# Patient Record
Sex: MALE | Race: WHITE | URBAN - METROPOLITAN AREA
[De-identification: names, ages, dates, MRNs, and addresses within clinical notes are randomized per-mention and may not be internally consistent; named-entity substitution may affect disease eponyms.]

---

## 2021-10-16 ENCOUNTER — IMMUNIZATION (OUTPATIENT)
Dept: FAMILY MEDICINE CLINIC | Facility: CLINIC | Age: 46
End: 2021-10-16
Payer: COMMERCIAL

## 2021-10-16 PROCEDURE — 90686 IIV4 VACC NO PRSV 0.5 ML IM: CPT | Performed by: NURSE PRACTITIONER

## 2021-10-16 PROCEDURE — 90471 IMMUNIZATION ADMIN: CPT | Performed by: NURSE PRACTITIONER

## 2022-11-06 ENCOUNTER — IMMUNIZATION (OUTPATIENT)
Dept: FAMILY MEDICINE CLINIC | Facility: CLINIC | Age: 47
End: 2022-11-06
Payer: COMMERCIAL

## 2022-11-06 PROCEDURE — 90686 IIV4 VACC NO PRSV 0.5 ML IM: CPT | Performed by: FAMILY MEDICINE

## 2022-11-06 PROCEDURE — 90471 IMMUNIZATION ADMIN: CPT | Performed by: FAMILY MEDICINE

## 2023-10-25 ENCOUNTER — OFFICE VISIT (OUTPATIENT)
Facility: LOCATION | Age: 48
End: 2023-10-25

## 2023-10-25 DIAGNOSIS — K21.9 LARYNGOPHARYNGEAL REFLUX: Primary | ICD-10-CM

## 2023-10-25 DIAGNOSIS — R13.10 DYSPHAGIA, UNSPECIFIED TYPE: ICD-10-CM

## 2023-10-25 PROCEDURE — 99204 OFFICE O/P NEW MOD 45 MIN: CPT | Performed by: OTOLARYNGOLOGY

## 2023-10-25 PROCEDURE — 31575 DIAGNOSTIC LARYNGOSCOPY: CPT | Performed by: OTOLARYNGOLOGY

## 2023-10-25 RX ORDER — OMEPRAZOLE 40 MG/1
40 CAPSULE, DELAYED RELEASE ORAL NIGHTLY
Qty: 30 CAPSULE | Refills: 2 | Status: SHIPPED | OUTPATIENT
Start: 2023-10-25

## 2023-10-25 NOTE — PROGRESS NOTES
[facility], [facility] Carly Ojeda    Report of Consultation    Date of Consult: 10/25/2023     Reason for Consultation:   Left-sided throat discomfort. History of Present Illness:   Patient is a 51year old male who is being seen for fullness within the region of his left neck. He had a ultrasound approximately 1 year ago at that time showed only 1 cm lymph node which appeared inflammatory. There is family history of thyroiditis. Currently feels a foreign body occasionally when he swallows without change in voice. Denies nausea or vomiting. Past Medical History  History reviewed. No pertinent past medical history. Past Surgical History  History reviewed. No pertinent surgical history. Family History  History reviewed. No pertinent family history. Social History  Patient Guardians:  Not on file    Other Topics            Concern    None on file    Social History Narrative    None on file            Current Medications:  Current Outpatient Medications   Medication Sig Dispense Refill    Omeprazole 40 MG Oral Capsule Delayed Release Take 1 capsule (40 mg total) by mouth at bedtime. 30 capsule 2       Allergies  Not on File    Review of Systems:   A comprehensive review of systems was negative. Physical Exam:   There were no vitals taken for this visit. Constitutional Normal Overall appearance - Normal.   Psychiatric Normal Orientation - Oriented to time, place, person & situation. Appropriate mood and affect.    Head/Face Normal Facial features -- Normal. Skull - Normal.   Eyes Normal Pupils equal ,round ,react to light and accomidate   Ears Normal External Ear Right: Normal, Left: Normal. Canal - Right: Normal, Left: Normal. TM - Right: Normal, Left: Normal.   Nose Normal External Nose, Normal, Septum -Midline, Right, Left Turbinates - Right: Normal, Hypertrophic Left: Normal, Hypertrophic   Mouth/Throat Normal Lips/teeth/gums - Normal. Tonsils - Normal. Oropharynx - Normal.   Neck Exam Normal Inspection - Normal. Palpation - Normal. Parotid gland - Normal. Thyroid gland - Normal.  There is no tenderness over the thyroid or erythema. Neurological Normal Memory - Normal. Cranial nerves - Cranial nerves II through XII grossly intact. Nasopharynx Normal  Normal        Skin Normal Inspection - Normal.        Lymph Detail Normal Submental. Submandibular. Anterior cervical. Posterior cervical. Supraclavicular. Flexible Laryngoscopy Procedure Note (26773)    Due to inability for adequate examination of the larynx and need for magnification to perform the examination, endoscopy was performed. Risks and benefits were discussed with patient/family and they have given verbal consent to proceed. Pre-operative Diagnosis: Laryngopharyngeal reflux  (primary encounter diagnosis)  Dysphagia, unspecified type    Post-operative Diagnosis: Same    Procedure: Diagnostic flexible fiberoptic laryngoscopy    Anesthesia: Topical 2% lidocaine with oxymetazoline 0.025%    Surgeon: Ramesh Ocasio MD    EBL: 0cc    Procedure Detail & Findings: Vocal cords appear mobile no polyps nodules. Slight swelling of the arytenoid cartilage. Condition: Stable    Complications: Patient tolerated the procedure well with no immediate complications. Results:     Laboratory Data:  No results found for: \"WBC\", \"HGB\", \"HCT\", \"PLT\", \"CREATSERUM\", \"BUN\", \"NA\", \"K\", \"CL\", \"CO2\", \"GLU\", \"CA\", \"ALB\", \"ALKPHO\", \"TP\", \"AST\", \"ALT\", \"PTT\", \"INR\", \"PTP\", \"T4F\", \"TSH\", \"TSHREFLEX\", \"POP\", \"LIP\", \"GGT\", \"PSA\", \"DDIMER\", \"ESRML\", \"ESRPF\", \"CRP\", \"BNP\", \"MG\", \"PHOS\", \"TROP\", \"CK\", \"CKMB\", \"JAMIE\", \"RPR\", \"B12\", \"ETOH\", \"POCGLU\"      Imaging:  No results found. Impression:   The discomfort may possibly related to reflux. Recommendations:  He was started on omeprazole at bedtime to see if that would lessen the symptoms. If symptoms would persist he should let us know in approximately 2 to 3 weeks.   If there is any further swelling in the neck recommend CT scan of neck with contrast.    Patient understands agrees with her treatment plan. Thank you for allowing me to participate in the care of your patient.       Scott Kenney MD  10/25/2023

## 2024-06-27 ENCOUNTER — HOSPITAL ENCOUNTER (OUTPATIENT)
Dept: CV DIAGNOSTICS | Facility: HOSPITAL | Age: 49
Discharge: HOME | End: 2024-06-27
Attending: FAMILY MEDICINE
Payer: COMMERCIAL

## 2024-06-27 DIAGNOSIS — I48.0 PAROXYSMAL ATRIAL FIBRILLATION (HCC): ICD-10-CM

## 2024-06-27 PROCEDURE — 93242 EXT ECG>48HR<7D RECORDING: CPT | Performed by: FAMILY MEDICINE

## 2024-06-27 PROCEDURE — 93243 EXT ECG>48HR<7D SCAN A/R: CPT | Performed by: FAMILY MEDICINE

## 2024-09-11 ENCOUNTER — HOSPITAL ENCOUNTER (OUTPATIENT)
Dept: LAB | Facility: HOSPITAL | Age: 49
Discharge: HOME | End: 2024-09-11
Attending: INTERNAL MEDICINE
Payer: COMMERCIAL

## 2024-09-11 LAB
T4 FREE SERPL-MCNC: 1.1 NG/DL (ref 0.8–1.7)
TSI SER-ACNC: 1.86 MIU/ML (ref 0.55–4.78)

## 2024-09-11 PROCEDURE — 84443 ASSAY THYROID STIM HORMONE: CPT | Performed by: INTERNAL MEDICINE

## 2024-09-11 PROCEDURE — 84439 ASSAY OF FREE THYROXINE: CPT | Performed by: INTERNAL MEDICINE

## 2024-09-11 PROCEDURE — 36415 COLL VENOUS BLD VENIPUNCTURE: CPT | Performed by: INTERNAL MEDICINE

## 2024-10-23 ENCOUNTER — APPOINTMENT (OUTPATIENT)
Dept: URBAN - METROPOLITAN AREA CLINIC 242 | Age: 49
Setting detail: DERMATOLOGY
End: 2024-10-23

## 2024-10-23 DIAGNOSIS — L63.8 OTHER ALOPECIA AREATA: ICD-10-CM

## 2024-10-23 PROCEDURE — OTHER MIPS QUALITY: OTHER

## 2024-10-23 PROCEDURE — 99202 OFFICE O/P NEW SF 15 MIN: CPT | Mod: 25

## 2024-10-23 PROCEDURE — 11900 INJECT SKIN LESIONS </W 7: CPT

## 2024-10-23 PROCEDURE — OTHER COUNSELING: OTHER

## 2024-10-23 PROCEDURE — OTHER INTRALESIONAL KENALOG: OTHER

## 2024-10-23 ASSESSMENT — LOCATION DETAILED DESCRIPTION DERM
LOCATION DETAILED: LEFT SUPERIOR OCCIPITAL SCALP
LOCATION DETAILED: RIGHT OCCIPITAL SCALP
LOCATION DETAILED: RIGHT SUPERIOR OCCIPITAL SCALP
LOCATION DETAILED: LEFT INFERIOR OCCIPITAL SCALP
LOCATION DETAILED: LEFT OCCIPITAL SCALP
LOCATION DETAILED: RIGHT INFERIOR OCCIPITAL SCALP
LOCATION DETAILED: RIGHT POSTERIOR PARIETAL SCALP

## 2024-10-23 ASSESSMENT — LOCATION SIMPLE DESCRIPTION DERM: LOCATION SIMPLE: POSTERIOR SCALP

## 2024-10-23 ASSESSMENT — LOCATION ZONE DERM: LOCATION ZONE: SCALP

## 2024-10-23 NOTE — PROCEDURE: INTRALESIONAL KENALOG
Kenalog Preparation: Kenalog
Treatment Number (Optional): 1
Ndc# For Kenalog Only: 3942-4773-95
How Many Mls Were Removed From The 40 Mg/Ml (5ml) Vial When Preparing The Injectable Solution?: 0
Total Volume (Ccs): 4.5
Validate Note Data When Using Inventory: Yes
Detail Level: Detailed
Medical Necessity Clause: This procedure was medically necessary because the lesions that were treated were:
Lot # For Kenalog (Optional): [id number]
Consent: The risks of atrophy were reviewed with the patient.
Administered By (Optional): dr Madrid
Kenalog Type Of Vial: Multiple Dose
Concentration Of Kenalog Solution Injected (Mg/Ml): 2.5
Expiration Date For Kenalog (Optional): 2026
Bill For Wasted Drug (Kenalog)?: no
Show Inventory Tab: Hide

## 2024-11-25 ENCOUNTER — APPOINTMENT (OUTPATIENT)
Dept: URBAN - METROPOLITAN AREA CLINIC 242 | Age: 49
Setting detail: DERMATOLOGY
End: 2024-11-25

## 2024-11-25 DIAGNOSIS — L63.8 OTHER ALOPECIA AREATA: ICD-10-CM

## 2024-11-25 PROCEDURE — 99213 OFFICE O/P EST LOW 20 MIN: CPT | Mod: 25

## 2024-11-25 PROCEDURE — OTHER COUNSELING: OTHER

## 2024-11-25 PROCEDURE — OTHER INTRALESIONAL KENALOG: OTHER

## 2024-11-25 PROCEDURE — OTHER MIPS QUALITY: OTHER

## 2024-11-25 PROCEDURE — 11900 INJECT SKIN LESIONS </W 7: CPT

## 2024-11-25 ASSESSMENT — LOCATION DETAILED DESCRIPTION DERM
LOCATION DETAILED: RIGHT OCCIPITAL SCALP
LOCATION DETAILED: LEFT SUPERIOR OCCIPITAL SCALP
LOCATION DETAILED: RIGHT INFERIOR OCCIPITAL SCALP
LOCATION DETAILED: LEFT INFERIOR OCCIPITAL SCALP
LOCATION DETAILED: LEFT OCCIPITAL SCALP
LOCATION DETAILED: RIGHT SUPERIOR OCCIPITAL SCALP
LOCATION DETAILED: RIGHT POSTERIOR PARIETAL SCALP

## 2024-11-25 ASSESSMENT — LOCATION SIMPLE DESCRIPTION DERM: LOCATION SIMPLE: POSTERIOR SCALP

## 2024-11-25 ASSESSMENT — LOCATION ZONE DERM: LOCATION ZONE: SCALP

## 2024-11-25 NOTE — PROCEDURE: INTRALESIONAL KENALOG
Kenalog Preparation: Kenalog
Treatment Number (Optional): 2
Ndc# For Kenalog Only: 9267-0835-41
How Many Mls Were Removed From The 40 Mg/Ml (5ml) Vial When Preparing The Injectable Solution?: 0
Total Volume (Ccs): 3
Validate Note Data When Using Inventory: Yes
Detail Level: Detailed
Medical Necessity Clause: This procedure was medically necessary because the lesions that were treated were:
Lot # For Kenalog (Optional): [id number]
Consent: The risks of atrophy were reviewed with the patient.
Administered By (Optional): dr Madrid
Kenalog Type Of Vial: Multiple Dose
Concentration Of Kenalog Solution Injected (Mg/Ml): 2.5
Expiration Date For Kenalog (Optional): 2026
Bill For Wasted Drug (Kenalog)?: no
Show Inventory Tab: Hide

## 2024-12-23 ENCOUNTER — APPOINTMENT (OUTPATIENT)
Dept: URBAN - METROPOLITAN AREA CLINIC 242 | Age: 49
Setting detail: DERMATOLOGY
End: 2024-12-23

## 2024-12-23 DIAGNOSIS — L63.8 OTHER ALOPECIA AREATA: ICD-10-CM

## 2024-12-23 PROCEDURE — 11901 INJECT SKIN LESIONS >7: CPT

## 2024-12-23 PROCEDURE — OTHER INTRALESIONAL KENALOG: OTHER

## 2024-12-23 PROCEDURE — OTHER COUNSELING: OTHER

## 2024-12-23 ASSESSMENT — LOCATION DETAILED DESCRIPTION DERM
LOCATION DETAILED: LEFT SUPERIOR PARIETAL SCALP
LOCATION DETAILED: RIGHT OCCIPITAL SCALP
LOCATION DETAILED: LEFT SUPERIOR OCCIPITAL SCALP
LOCATION DETAILED: LEFT INFERIOR OCCIPITAL SCALP
LOCATION DETAILED: MID-OCCIPITAL SCALP
LOCATION DETAILED: LEFT OCCIPITAL SCALP
LOCATION DETAILED: RIGHT SUPERIOR OCCIPITAL SCALP
LOCATION DETAILED: POSTERIOR MID-PARIETAL SCALP
LOCATION DETAILED: RIGHT POSTERIOR PARIETAL SCALP
LOCATION DETAILED: RIGHT INFERIOR OCCIPITAL SCALP

## 2024-12-23 ASSESSMENT — LOCATION SIMPLE DESCRIPTION DERM
LOCATION SIMPLE: POSTERIOR SCALP
LOCATION SIMPLE: SCALP

## 2024-12-23 ASSESSMENT — LOCATION ZONE DERM: LOCATION ZONE: SCALP

## 2024-12-23 NOTE — PROCEDURE: INTRALESIONAL KENALOG
Kenalog Preparation: Kenalog
Treatment Number (Optional): 3
Ndc# For Kenalog Only: 4980-8399-20
How Many Mls Were Removed From The 40 Mg/Ml (5ml) Vial When Preparing The Injectable Solution?: 0
Total Volume (Ccs): 1.5
Validate Note Data When Using Inventory: Yes
Detail Level: Detailed
Medical Necessity Clause: This procedure was medically necessary because the lesions that were treated were:
Lot # For Kenalog (Optional): [id number]
Consent: The risks of atrophy were reviewed with the patient.
Administered By (Optional): Dr. Madrid
Kenalog Type Of Vial: Multiple Dose
Concentration Of Kenalog Solution Injected (Mg/Ml): 2.5
Expiration Date For Kenalog (Optional): 5/2026
Bill For Wasted Drug (Kenalog)?: no
Show Inventory Tab: Hide

## 2025-01-15 ENCOUNTER — APPOINTMENT (OUTPATIENT)
Dept: URBAN - METROPOLITAN AREA CLINIC 242 | Age: 50
Setting detail: DERMATOLOGY
End: 2025-01-15

## 2025-01-15 DIAGNOSIS — L63.8 OTHER ALOPECIA AREATA: ICD-10-CM

## 2025-01-15 PROCEDURE — 11900 INJECT SKIN LESIONS </W 7: CPT

## 2025-01-15 PROCEDURE — OTHER COUNSELING: OTHER

## 2025-01-15 PROCEDURE — OTHER PHOTO-DOCUMENTATION: OTHER

## 2025-01-15 PROCEDURE — OTHER INTRALESIONAL KENALOG: OTHER

## 2025-01-15 PROCEDURE — OTHER PRESCRIPTION MEDICATION MANAGEMENT: OTHER

## 2025-01-15 PROCEDURE — OTHER MIPS QUALITY: OTHER

## 2025-01-15 ASSESSMENT — LOCATION ZONE DERM: LOCATION ZONE: SCALP

## 2025-01-15 ASSESSMENT — LOCATION SIMPLE DESCRIPTION DERM: LOCATION SIMPLE: SCALP

## 2025-01-15 ASSESSMENT — LOCATION DETAILED DESCRIPTION DERM: LOCATION DETAILED: RIGHT SUPERIOR OCCIPITAL SCALP

## 2025-01-15 NOTE — PROCEDURE: INTRALESIONAL KENALOG
Kenalog Preparation: Kenalog
How Many Mls Were Removed From The 40 Mg/Ml (10ml) Vial When Preparing The Injectable Solution?: 0
Ndc# For Kenalog Only: 8034-5664-03
Bill For Wasted Drug (Kenalog)?: no
Concentration Of Kenalog Solution Injected (Mg/Ml): 5.0
X Size Of Lesion In Cm (Optional): 1.5
Total Volume (Ccs): 0.6
Detail Level: Detailed
Administered By (Optional): ONELIA
Kenalog Type Of Vial: Multiple Dose
Medical Necessity Clause: This procedure was medically necessary because the lesions that were treated were:
Validate Note Data When Using Inventory: Yes
Expiration Date For Kenalog (Optional): MAY 2026
Show Inventory Tab: Hide
Consent: The risks of atrophy were reviewed with the patient.
Lot # For Kenalog (Optional): [id number]

## 2025-01-15 NOTE — PROCEDURE: PRESCRIPTION MEDICATION MANAGEMENT
Render In Strict Bullet Format?: No
Detail Level: Zone
Defer Treatment (Provide Reason For Deferment In Text Field Below): ILK to other patches of Alopecia - too soon for therapy \\nNew area treated today

## 2025-02-19 ENCOUNTER — APPOINTMENT (OUTPATIENT)
Dept: URBAN - METROPOLITAN AREA CLINIC 242 | Age: 50
Setting detail: DERMATOLOGY
End: 2025-02-19

## 2025-02-19 DIAGNOSIS — L63.8 OTHER ALOPECIA AREATA: ICD-10-CM

## 2025-02-19 PROCEDURE — OTHER MIPS QUALITY: OTHER

## 2025-02-19 PROCEDURE — OTHER COUNSELING: OTHER

## 2025-02-19 PROCEDURE — OTHER INTRALESIONAL KENALOG: OTHER

## 2025-02-19 PROCEDURE — 11900 INJECT SKIN LESIONS </W 7: CPT

## 2025-02-19 ASSESSMENT — LOCATION SIMPLE DESCRIPTION DERM: LOCATION SIMPLE: SCALP

## 2025-02-19 ASSESSMENT — LOCATION ZONE DERM: LOCATION ZONE: SCALP

## 2025-02-19 ASSESSMENT — LOCATION DETAILED DESCRIPTION DERM: LOCATION DETAILED: RIGHT SUPERIOR OCCIPITAL SCALP

## 2025-02-19 NOTE — PROCEDURE: INTRALESIONAL KENALOG
Kenalog Preparation: Kenalog
How Many Mls Were Removed From The 40 Mg/Ml (10ml) Vial When Preparing The Injectable Solution?: 0
Ndc# For Kenalog Only: [address]-3
Bill For Wasted Drug (Kenalog)?: no
Concentration Of Kenalog Solution Injected (Mg/Ml): 5.0
X Size Of Lesion In Cm (Optional): 3
Total Volume (Ccs): 0.8
Detail Level: Detailed
Treatment Number (Optional): 2
Administered By (Optional): ONELIA
Kenalog Type Of Vial: Multiple Dose
Medical Necessity Clause: This procedure was medically necessary because the lesions that were treated were:
Validate Note Data When Using Inventory: Yes
Expiration Date For Kenalog (Optional): 2025/09
Show Inventory Tab: Hide
Consent: The risks of atrophy were reviewed with the patient.
Lot # For Kenalog (Optional): [id number]
Size Of Lesion (Optional): 2.5

## 2025-02-20 ENCOUNTER — APPOINTMENT (OUTPATIENT)
Dept: URBAN - METROPOLITAN AREA CLINIC 242 | Age: 50
Setting detail: DERMATOLOGY
End: 2025-02-20

## 2025-02-20 DIAGNOSIS — L63.8 OTHER ALOPECIA AREATA: ICD-10-CM

## 2025-02-20 PROCEDURE — OTHER COUNSELING: OTHER

## 2025-02-20 PROCEDURE — OTHER INTRALESIONAL KENALOG: OTHER

## 2025-02-20 PROCEDURE — OTHER MIPS QUALITY: OTHER

## 2025-02-20 PROCEDURE — 11900 INJECT SKIN LESIONS </W 7: CPT

## 2025-02-20 ASSESSMENT — LOCATION SIMPLE DESCRIPTION DERM: LOCATION SIMPLE: SCALP

## 2025-02-20 ASSESSMENT — LOCATION ZONE DERM: LOCATION ZONE: SCALP

## 2025-02-20 ASSESSMENT — LOCATION DETAILED DESCRIPTION DERM: LOCATION DETAILED: LEFT CENTRAL PARIETAL SCALP

## 2025-02-20 NOTE — PROCEDURE: INTRALESIONAL KENALOG
Include Z78.9 (Other Specified Conditions Influencing Health Status) As An Associated Diagnosis?: No
Expiration Date For Kenalog (Optional): 2025/09
Concentration Of Kenalog Solution Injected (Mg/Ml): 5.0
Ndc# For Kenalog Only: [address]-3
Detail Level: Detailed
How Many Mls Were Removed From The 40 Mg/Ml (10ml) Vial When Preparing The Injectable Solution?: 0
Treatment Number (Optional): 1
Kenalog Preparation: Kenalog
Which Kenalog Vial Was Used?: Kenalog 40 mg/ml (10 ml vial)
Total Volume (Ccs): 0.5
Validate Note Data When Using Inventory: Yes
Medical Necessity Clause: This procedure was medically necessary because the lesions that were treated were:
Administered By (Optional): ONELIA
Lot # For Kenalog (Optional): [id number]
Show Inventory Tab: Hide
Consent: The risks of atrophy were reviewed with the patient.
Kenalog Type Of Vial: Multiple Dose

## 2025-03-14 NOTE — IMAGING NOTE
Call placed to pt regarding CTA Gated Thoracic Aorta on 3/18/25 at 1300. Left voice message with instructions.  Instructed to arrive at 1245.  May eat a light breakfast/lunch but drink plenty of fluids a day before and morning of procedure..   Advised to hold caffeine 12 hrs prior to procedure.   Pt may take his usual meds. For further questions, return our call.

## 2025-03-18 ENCOUNTER — HOSPITAL ENCOUNTER (OUTPATIENT)
Dept: CT IMAGING | Facility: HOSPITAL | Age: 50
Discharge: HOME | End: 2025-03-18
Attending: INTERNAL MEDICINE
Payer: COMMERCIAL

## 2025-03-18 VITALS — HEART RATE: 58 BPM | SYSTOLIC BLOOD PRESSURE: 124 MMHG | DIASTOLIC BLOOD PRESSURE: 74 MMHG

## 2025-03-18 DIAGNOSIS — I77.810 DILATATION OF THORACIC AORTA: ICD-10-CM

## 2025-03-18 LAB
CREAT BLD-MCNC: 1.4 MG/DL
EGFRCR SERPLBLD CKD-EPI 2021: 61 ML/MIN/1.73M2 (ref 60–?)

## 2025-03-18 PROCEDURE — 71275 CT ANGIOGRAPHY CHEST: CPT | Performed by: INTERNAL MEDICINE

## 2025-03-18 PROCEDURE — 82565 ASSAY OF CREATININE: CPT

## 2025-03-18 RX ORDER — METOPROLOL TARTRATE 1 MG/ML
5 INJECTION, SOLUTION INTRAVENOUS SEE ADMIN INSTRUCTIONS
Status: DISCONTINUED | OUTPATIENT
Start: 2025-03-18 | End: 2025-03-20

## 2025-03-18 RX ORDER — DILTIAZEM HYDROCHLORIDE 5 MG/ML
5 INJECTION INTRAVENOUS SEE ADMIN INSTRUCTIONS
Status: DISCONTINUED | OUTPATIENT
Start: 2025-03-18 | End: 2025-03-20

## 2025-03-18 NOTE — IMAGING NOTE
1310- Pt A+O x4. Procedure explained and questions answered. Pt ambulatory and assisted to CT table.  GFR 61.  See flowsheet for VS.    CT tech Tarazo     Contrast= 90  Saline= 100  Average HR= 57    1319- Pt tolerated scan well.  Denies S/S of contrast reaction.  IV d/c'd at 1319.  Cath intact, bleeding controlled.  Pt ambulatory and escorted to changing room for discharge home.

## 2025-04-14 ENCOUNTER — APPOINTMENT (OUTPATIENT)
Dept: URBAN - METROPOLITAN AREA CLINIC 242 | Age: 50
Setting detail: DERMATOLOGY
End: 2025-04-14

## 2025-04-14 DIAGNOSIS — L65.9 NONSCARRING HAIR LOSS, UNSPECIFIED: ICD-10-CM

## 2025-04-14 DIAGNOSIS — L63.8 OTHER ALOPECIA AREATA: ICD-10-CM

## 2025-04-14 PROCEDURE — 99212 OFFICE O/P EST SF 10 MIN: CPT | Mod: 25

## 2025-04-14 PROCEDURE — 11900 INJECT SKIN LESIONS </W 7: CPT

## 2025-04-14 PROCEDURE — OTHER MIPS QUALITY: OTHER

## 2025-04-14 PROCEDURE — OTHER INTRALESIONAL KENALOG: OTHER

## 2025-04-14 PROCEDURE — OTHER COUNSELING: OTHER

## 2025-04-14 ASSESSMENT — LOCATION SIMPLE DESCRIPTION DERM
LOCATION SIMPLE: SCALP
LOCATION SIMPLE: LEFT SCALP

## 2025-04-14 ASSESSMENT — LOCATION DETAILED DESCRIPTION DERM
LOCATION DETAILED: LEFT CENTRAL PARIETAL SCALP
LOCATION DETAILED: LEFT LATERAL FRONTAL SCALP

## 2025-04-14 ASSESSMENT — LOCATION ZONE DERM: LOCATION ZONE: SCALP

## 2025-04-14 NOTE — PROCEDURE: INTRALESIONAL KENALOG
Include Z78.9 (Other Specified Conditions Influencing Health Status) As An Associated Diagnosis?: No
Expiration Date For Kenalog (Optional): 2025/09
Concentration Of Kenalog Solution Injected (Mg/Ml): 5.0
Ndc# For Kenalog Only: [address]-3
Detail Level: Detailed
How Many Mls Were Removed From The 40 Mg/Ml (10ml) Vial When Preparing The Injectable Solution?: 0
Treatment Number (Optional): 7
Kenalog Preparation: Kenalog
Which Kenalog Vial Was Used?: Kenalog 40 mg/ml (10 ml vial)
Total Volume (Ccs): 1.5
Validate Note Data When Using Inventory: Yes
Medical Necessity Clause: This procedure was medically necessary because the lesions that were treated were:
Administered By (Optional): Julius
Lot # For Kenalog (Optional): [id number]
Show Inventory Tab: Hide
Consent: The risks of atrophy were reviewed with the patient.
Kenalog Type Of Vial: Multiple Dose